# Patient Record
Sex: MALE | Race: WHITE | Employment: FULL TIME | URBAN - METROPOLITAN AREA
[De-identification: names, ages, dates, MRNs, and addresses within clinical notes are randomized per-mention and may not be internally consistent; named-entity substitution may affect disease eponyms.]

---

## 2019-04-19 ENCOUNTER — HOSPITAL ENCOUNTER (OUTPATIENT)
Dept: CT IMAGING | Age: 49
Discharge: HOME | End: 2019-04-19
Attending: INTERNAL MEDICINE

## 2019-04-19 DIAGNOSIS — Z13.6 SCREENING FOR HEART DISEASE: ICD-10-CM

## 2019-04-19 NOTE — PROGRESS NOTES
[facility] 82 [address] [id number]  QW=[id number]  Cholestec labs as follows:  ZV=577  HDL=52  OKG=896  QU=597  GLUCOSE=125 nonfasting  All results and risk factors discussed with patient; all questions and concerns addressed.   Education